# Patient Record
Sex: FEMALE | Race: WHITE
[De-identification: names, ages, dates, MRNs, and addresses within clinical notes are randomized per-mention and may not be internally consistent; named-entity substitution may affect disease eponyms.]

---

## 2018-01-22 ENCOUNTER — HOSPITAL ENCOUNTER (EMERGENCY)
Dept: HOSPITAL 42 - ED | Age: 38
Discharge: HOME | End: 2018-01-22
Payer: COMMERCIAL

## 2018-01-22 VITALS
OXYGEN SATURATION: 100 % | RESPIRATION RATE: 18 BRPM | SYSTOLIC BLOOD PRESSURE: 100 MMHG | DIASTOLIC BLOOD PRESSURE: 82 MMHG | HEART RATE: 85 BPM

## 2018-01-22 VITALS — BODY MASS INDEX: 34.9 KG/M2

## 2018-01-22 VITALS — TEMPERATURE: 98.1 F

## 2018-01-22 DIAGNOSIS — S29.011A: Primary | ICD-10-CM
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
## 2018-01-22 LAB
ALBUMIN SERPL-MCNC: 4.3 G/DL (ref 3–4.8)
ALBUMIN/GLOB SERPL: 1.3 {RATIO} (ref 1.1–1.8)
ALT SERPL-CCNC: 19 U/L (ref 7–56)
AST SERPL-CCNC: 20 U/L (ref 14–36)
BASOPHILS # BLD AUTO: 0.02 K/MM3 (ref 0–2)
BASOPHILS NFR BLD: 0.5 % (ref 0–3)
BUN SERPL-MCNC: 14 MG/DL (ref 7–21)
CALCIUM SERPL-MCNC: 9.8 MG/DL (ref 8.4–10.5)
EOSINOPHIL # BLD: 0 10*3/UL (ref 0–0.7)
EOSINOPHIL NFR BLD: 1 % (ref 1.5–5)
ERYTHROCYTE [DISTWIDTH] IN BLOOD BY AUTOMATED COUNT: 12.7 % (ref 11.5–14.5)
GFR NON-AFRICAN AMERICAN: > 60
GRANULOCYTES # BLD: 1.96 10*3/UL (ref 1.4–6.5)
GRANULOCYTES NFR BLD: 47.5 % (ref 50–68)
HGB BLD-MCNC: 11.7 G/DL (ref 12–16)
LYMPHOCYTES # BLD: 1.8 10*3/UL (ref 1.2–3.4)
LYMPHOCYTES NFR BLD AUTO: 44.2 % (ref 22–35)
MAGNESIUM SERPL-MCNC: 1.7 MG/DL (ref 1.7–2.2)
MCH RBC QN AUTO: 29 PG (ref 25–35)
MCHC RBC AUTO-ENTMCNC: 33.8 G/DL (ref 31–37)
MCV RBC AUTO: 85.6 FL (ref 80–105)
MONOCYTES # BLD AUTO: 0.3 10*3/UL (ref 0.1–0.6)
MONOCYTES NFR BLD: 6.8 % (ref 1–6)
PLATELET # BLD: 161 10^3/UL (ref 120–450)
PMV BLD AUTO: 9.4 FL (ref 7–11)
RBC # BLD AUTO: 4.04 10^6/UL (ref 3.5–6.1)
TROPONIN I SERPL-MCNC: < 0.01 NG/ML
WBC # BLD AUTO: 4.1 10^3/UL (ref 4.5–11)

## 2018-01-22 NOTE — ED PDOC
Arrival/HPI





- General


Chief Complaint: Chest Pain


Time Seen by Provider: 18 07:11


Historian: Patient





- History of Present Illness


Narrative History of Present Illness (Text): 





18 07:28


CC: chest pain





37F presents with history of intermittent sharp chest pain starting  after 

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 

pulls carts. Patient describes pain as squeezing that goes from the left chest 

to below the breast and to the back which becomes more like a throbbing and 

burning sensation. Patient states that going up the stairs and walking here 

brings on the pain. Patient states she's never felt like this before. Patient 

also notes pressure after she eats at night in the midsternal area as well as a 

burning pain.Patient denies fever, chills, nausea, vomiting. Admits to 

eructation, and pain with movement.








PMH: eclampsia


PSH:  x2


Family history: Parents have been known to "faint from loss of oxygen"  no 

history of MI, mother and father both had stroke


Social Hx: night shift post  (starting )


PMD: Dr. Diana Rivero


Time/Duration: > week


Symptom Onset: Sudden


Symptom Course: Intermittent


Quality: Stabbing, Burning, Fullness, Throbbing


Activities at Onset: Eating (feeling of fullness after eating at night), Other (

walking up the stairs produce the stabbing, burning, throbbing pain)


Context: Exertion (stairs)





Past Medical History





- Provider Review


Nursing Documentation Reviewed: Yes





- Travel History


Have you recently traveled outside [place] w/in the past 3 mons?: No





- Past History


Past History: No Previous





- Infectious Disease


Hx of Infectious Diseases: None





- Tetanus Immunization


Tetanus Immunization: Unknown





- Reproductive


Menopause: No


Currently Pregnant: No





- Past Medical History


Past Medical History: No Previous





- Psychiatric


Hx Substance Use: No





- Past Surgical History


Past Surgical History: No Previous





- Surgical History


Hx  Section: Yes (x2)





- Anesthesia


Hx Anesthesia: Yes


Hx Anesthesia Reactions: No


Hx Malignant Hyperthermia: No





- Suicidal Assessment


Feels Threatened In Home Enviroment: No





Family/Social History





- Physician Review


Nursing Documentation Reviewed: Yes


Family/Social History: Other


Narrative Family History (Free Text): 





18 07:28


patient states parents had issues with syncope due to poor oxygenation 


18 07:30





Smoking Status: Never Smoked


Hx Alcohol Use: No


Hx Substance Use: No


Hx Substance Use Treatment: No





Allergies/Home Meds


Allergies/Adverse Reactions: 


Allergies





No Known Allergies Allergy (Verified 14 00:11)


 








Home Medications: 


 Home Meds











 Medication  Instructions  Recorded  Confirmed


 


No Known Home Med [No Known Home  14





Med]   














Review of Systems





- Physician Review


All systems were reviewed & negative as marked: Yes





- Review of Systems


Constitutional: absent: Fatigue, Weight Change, Fevers


Eyes: absent: Vision Changes, Photophobia, Eye Pain


ENT: absent: Hearing Changes, Tinnitus, TMJ Pain


Respiratory: absent: SOB, Cough, Sputum, Wheezing


Cardiovascular: Chest Pain.  absent: Palpitations, Edema


Gastrointestinal: absent: Abdominal Pain, Stool Changes, Constipation, Diarrhea


Genitourinary Female: absent: Dysuria, Frequency, Hematuria


Musculoskeletal: absent: Arthralgias, Back Pain, Neck Pain


Skin: absent: Rash, Pruritis, Skin Lesions


Neurological: absent: Headache, Dizziness, Focal Weakness, Speech Changes, 

Facial Droop, Disequilibrium


Endocrine: absent: Diaphoresis, Polyuria, Polydipsia


Hemo/Lymphatic: absent: Adenopathy, Easy Bleeding, Easy Bruising


Psychiatric: absent: Anxiety, Depression, Suicidal Ideation





Physical Exam


Vital Signs Reviewed: Yes


Vital Signs











  Temp Pulse Resp BP Pulse Ox


 


 18 07:36  98.1 F  76  16  121/86  98











Temperature: Afebrile


Blood Pressure: Normal


Pulse: Regular


Respiratory Rate: Normal


Appearance: Positive for: Comfortable


Mental Status: Positive for: Alert and Oriented X 3





- Systems Exam


Head: Present: Atraumatic, Normocephalic.  No: Tenderness, Laceration


Pupils: Present: PERRL


Extroacular Muscles: Present: EOMI


Conjunctiva: Present: Normal.  No: Injected, Icteric


Mouth: Present: Moist Mucous Membranes, Normal Lips.  No: Dry, Drooling, Trismus


Pharnyx: Present: Normal.  No: ERYTHEMA, EXUDATE


Nose (External): Present: Atraumatic.  No: Abrasion, Contusion


Nose (Internal): Present: Normal Inspection.  No: No Active Bleeding, Moist


Neck: Present: Normal Range of Motion, Trachea Midline.  No: JVD, 

Lymphadenopathy


Respiratory/Chest: Present: Clear to Auscultation, Good Air Exchange.  No: 

Respiratory Distress, Accessory Muscle Use, Wheezes, Decreased Breath Sounds, 

Rhonchi, Tender to Palpation


Cardiovascular: Present: Regular Rate and Rhythm, Peripheal Pulses Present.  No

: Murmurs, Normal S1, S2, Irregular Rhythm, Tachycardic, Bradycardic, Rub, 

Gallop, Muffled


Breast/Axillary: Present: Symmetrical.  No: Masses, Tender to Palpation


Back: Present: Normal Inspection.  No: CVA Tenderness, Midline Tenderness, Pain 

with Leg Raise


Upper Extremity: Present: Normal Inspection, Capillary Refill < 2s.  No: 

Cyanosis, Edema


Lower Extremity: Present: Normal Inspection, Normal ROM, Capillary Refill < 2 s


Neurological: Present: GCS=15, CN II-XII Intact.  No: Speech Normal


Skin: Present: Warm, Dry, Normal Color.  No: Rashes


Lymphatic: No: Cervical Adenopathy, Axillary Adenopathy, Inguinal Adenopathy


Psychiatric: Present: Alert, Oriented x 3, Normal Insight, Normal Concentration





Medical Decision Making


ED Course and Treatment: 





18 07:41


EKG


troponin


CBC


CMP


Mag


Phos


ibuprofen for pain


   


Re-evaluation Time: 09:00


Reassessment Condition: Re-examined, Improved





- Lab Interpretations


Lab Results: 








 18 08:30 





 18 08:30 





 Lab Results





18 08:30: Sodium 139, Potassium 4.4, Chloride 105, Carbon Dioxide 24, 

Anion Gap 14, BUN 14, Creatinine 0.6 L, Est GFR ( Amer) > 60, Est GFR (

Non-Af Amer) > 60, Random Glucose 90, Calcium 9.8, Phosphorus 3.5, Magnesium 1.7

, Total Bilirubin 0.3, AST 20, ALT 19, Alkaline Phosphatase 37 L, Troponin I < 

0.01, Total Protein 7.5, Albumin 4.3, Globulin 3.3, Albumin/Globulin Ratio 1.3


18 08:30: WBC 4.1 L, RBC 4.04, Hgb 11.7 L, Hct 34.6 L, MCV 85.6, MCH 29.0

, MCHC 33.8, RDW 12.7, Plt Count 161, MPV 9.4, Gran % 47.5 L, Lymph % (Auto) 

44.2 H, Mono % (Auto) 6.8 H, Eos % (Auto) 1.0 L, Baso % (Auto) 0.5, Gran # 1.96

, Lymph # 1.8, Mono # 0.3, Eos # 0.0, Baso # 0.02








I have reviewed the lab results: Yes


Interpretation: No clinic. lab abnormalty





- EKG Interpretation


EKG Interpretation (Text): 





18 07:47


NS @ 75bpm


MS interval 160ms, QTc 419 ms





Interpreted by ED Physician: Yes


Type: 12 lead EKG





- Medication Orders


Current Medication Orders: 











Discontinued Medications





Ibuprofen (Motrin Tab)  600 mg PO STAT STA


   Stop: 18 07:43


   Last Admin: 18 08:30  Dose: 600 mg





MAR Pain/Vitals


 Document     18 08:30  JULIA  (Rec: 18 08:41  JULIA  [id number])


     Pain Reassessment


      Is This A Pain ReAssessment?               No


     Sleep


      Is patient sleeping during reassessment?   No


     Presence of Pain


      Presence of Pain                           Yes














Disposition/Present on Arrival





- Present on Arrival


Any Indicators Present on Arrival: No


History of DVT/PE: No


History of Uncontrolled Diabetes: No


Urinary Catheter: No


History Surgical Site Infection Following: None





- Disposition


Have Diagnosis and Disposition been Completed?: Yes


Diagnosis: 


 Muscle strain of anterior chest wall





Disposition: HOME/ ROUTINE


Disposition Time: 09:17


Patient Problems: 


 Current Active Problems











Problem Status Onset


 


Muscle strain of anterior chest wall Acute  











Condition: FAIR


Additional Instructions: 


rest, try not to do heavy lifting for a few days, may return to work


follow up with Primary care doctor within 1 week


return to ED if chest pain worsens, syncope, fever, chills.


Referrals: 


Diana Rivero MD [Primary Care Provider] - Follow up with primary


Forms:  [employer] (English)

## 2018-01-22 NOTE — CARD
--------------- APPROVED REPORT --------------





EKG Measurement

Heart [id number]

OH [id number]

[id number]

[id number]

[id number]



<Conclusion>

Normal sinus rhythm with sinus arrhythmia

Normal ECG